# Patient Record
Sex: FEMALE | Race: WHITE
[De-identification: names, ages, dates, MRNs, and addresses within clinical notes are randomized per-mention and may not be internally consistent; named-entity substitution may affect disease eponyms.]

---

## 2018-03-12 ENCOUNTER — HOSPITAL ENCOUNTER (EMERGENCY)
Dept: HOSPITAL 5 - ED | Age: 18
Discharge: HOME | End: 2018-03-12
Payer: COMMERCIAL

## 2018-03-12 VITALS — DIASTOLIC BLOOD PRESSURE: 70 MMHG | SYSTOLIC BLOOD PRESSURE: 124 MMHG

## 2018-03-12 DIAGNOSIS — F17.200: ICD-10-CM

## 2018-03-12 DIAGNOSIS — N76.0: Primary | ICD-10-CM

## 2018-03-12 DIAGNOSIS — N39.0: ICD-10-CM

## 2018-03-12 DIAGNOSIS — B37.3: ICD-10-CM

## 2018-03-12 LAB
BILIRUB UR QL STRIP: (no result)
BLOOD UR QL VISUAL: (no result)
MUCOUS THREADS #/AREA URNS HPF: (no result) /HPF
PH UR STRIP: 6 [PH] (ref 5–7)
PROT UR STRIP-MCNC: (no result) MG/DL
RBC #/AREA URNS HPF: 2 /HPF (ref 0–6)
UROBILINOGEN UR-MCNC: < 2 MG/DL (ref ?–2)
WBC #/AREA URNS HPF: 10 /HPF (ref 0–6)

## 2018-03-12 PROCEDURE — 81001 URINALYSIS AUTO W/SCOPE: CPT

## 2018-03-12 PROCEDURE — 81025 URINE PREGNANCY TEST: CPT

## 2018-03-12 PROCEDURE — 99284 EMERGENCY DEPT VISIT MOD MDM: CPT

## 2018-03-12 PROCEDURE — 96372 THER/PROPH/DIAG INJ SC/IM: CPT

## 2018-03-12 PROCEDURE — 87591 N.GONORRHOEAE DNA AMP PROB: CPT

## 2018-03-12 PROCEDURE — 87210 SMEAR WET MOUNT SALINE/INK: CPT

## 2018-03-12 NOTE — EMERGENCY DEPARTMENT REPORT
HPI





- General


Chief Complaint: Urogenital-Female


Time Seen by Provider: 03/12/18 03:50





- HPI


HPI: 





Room 26





The patient is a 17-year-old female presenting with chief complaint of vaginal 

pain and discharge.  She states for one week she has had vaginal itching and 

pain.  Patient admits to dysuria for one week in addition to white vaginal 

discharge.  Patient denies any preceding trauma.  Patient denies any history of 

fever.  Patient denies hematuria.  Patient's last menstrual cycle occurred 03/02 /2018 and was within normal limits.  The patient currently gives her pain a 

score of 5/10





Location: Vagina


Duration: One week


Quality: Burning


Severity: 5/10


Modifying factors: [see above]


Context: [see above]


Mode of transportation: [not driving]





ED Past Medical Hx





- Past Medical History


Previous Medical History?: No





- Surgical History


Past Surgical History?: No





- Family History


Family history: no significant





- Social History


Smoking Status: Current Some Day Smoker (occasional)


Substance Use Type: None (denies illicit drug use), Alcohol (occasional)





- Medications


Home Medications: 


 Home Medications











 Medication  Instructions  Recorded  Confirmed  Last Taken  Type


 


Fluconazole [Diflucan TAB] 150 mg PO ONCE #1 tablet 03/12/18  Unknown Rx


 


Phenazopyridine [Pyridium] 200 mg PO TID #6 tab 03/12/18  Unknown Rx


 


Sulfamethoxazole/Trimethoprim 1 each PO BID #14 tablet 03/12/18  Unknown Rx





[Bactrim DS TAB]     


 


metroNIDAZOLE [Flagyl] 500 mg PO Q12HR #14 tab 03/12/18  Unknown Rx


 


traMADol [Ultram] 50 mg PO Q6HR PRN #10 tablet 03/12/18  Unknown Rx














ED Review of Systems


ROS: 


Stated complaint: VAGINAL PAIN


Other details as noted in HPI





Constitutional: denies: fever


Genitourinary: dysuria, discharge.  denies: hematuria, abnormal menses


Musculoskeletal: denies: back pain





Physical Exam





- Physical Exam


Vital Signs: 


 Vital Signs











  03/12/18





  02:45


 


Temperature 98.1 F


 


Pulse Rate 66


 


Respiratory 18





Rate 


 


Blood Pressure 119/74


 


O2 Sat by Pulse 100





Oximetry 











Physical Exam: 





GENERAL: The patient is well-developed well-nourished female lying on stretcher 

not appearing to be in acute distress. []


HEENT: Normocephalic.  Atraumatic.  Extraocular motions are intact.  Patient 

has moist mucous membranes.


NECK: Supple.  Trachea midline


CHEST/LUNGS: Clear to auscultation.  There is no respiratory distress noted.


HEART/CARDIOVASCULAR: Regular.  There is no tachycardia.  There is no gallop 

rub or murmur.


ABDOMEN: Abdomen is soft, nontender.  Patient has normal bowel sounds.  There 

is no abdominal distention.


SKIN: There is no rash.  There is no edema.  There is no diaphoresis.


NEURO: The patient is awake, alert, and oriented.  The patient is cooperative.  

The patient has normal speech


MUSCULOSKELETAL: There is no CVA tenderness.  There is no evidence of acute 

injury.





ED Course


 Vital Signs











  03/12/18





  02:45


 


Temperature 98.1 F


 


Pulse Rate 66


 


Respiratory 18





Rate 


 


Blood Pressure 119/74


 


O2 Sat by Pulse 100





Oximetry 














ED Medical Decision Making





- Lab Data





 Laboratory Tests











  03/12/18





  03:35


 


Urine Color  Yellow


 


Urine Turbidity  Clear


 


Urine pH  6.0


 


Ur Specific Gravity  1.016


 


Urine Protein  <15 mg/dl


 


Urine Glucose (UA)  Neg


 


Urine Ketones  Neg


 


Urine Blood  Neg


 


Urine Nitrite  Neg


 


Ur Reducing Substances  Not Reportable


 


Urine Bilirubin  Neg


 


Urine Ictotest  Not Reportable


 


Urine Urobilinogen  < 2.0


 


Ur Leukocyte Esterase  Mod


 


Urine WBC (Auto)  10.0 H


 


Urine RBC (Auto)  2.0


 


U Epithel Cells (Auto)  3.0


 


Urine Mucus  Few


 


Urine HCG, Qual  Negative








Wet prep-greater than 20% clue cells present, yeast present, no Trichomonas





- Differential Diagnosis


urethritis, bacterial vaginosis, vaginal candidiasis, UTI, pregnancy


Critical care attestation.: 


If time is entered above; I have spent that time in minutes in the direct care 

of this critically ill patient, excluding procedure time.








ED Disposition


Clinical Impression: 


 Bacterial vaginosis, Vaginal candidiasis, UTI (urinary tract infection)





Disposition: DC-01 TO HOME OR SELFCARE


Is pt being admited?: No


Does the pt Need Aspirin: No


Condition: Stable


Instructions:  Bacterial Vaginosis (ED)


Additional Instructions: 


Return to the emergency department immediately should you develop worsening 

symptoms, fever, inability to tolerate food or liquid or any other concerns.


Prescriptions: 


Fluconazole [Diflucan TAB] 150 mg PO ONCE #1 tablet


metroNIDAZOLE [Flagyl] 500 mg PO Q12HR #14 tab


Phenazopyridine [Pyridium] 200 mg PO TID #6 tab


Sulfamethoxazole/Trimethoprim [Bactrim DS TAB] 1 each PO BID #14 tablet


traMADol [Ultram] 50 mg PO Q6HR PRN #10 tablet


 PRN Reason: Pain


Referrals: 


[facility] [Provider Group] - 3-5 Days


Forms:  STI Treatment and Prevention


Time of Disposition: 05:38